# Patient Record
Sex: MALE | Race: WHITE | NOT HISPANIC OR LATINO | Employment: OTHER | URBAN - METROPOLITAN AREA
[De-identification: names, ages, dates, MRNs, and addresses within clinical notes are randomized per-mention and may not be internally consistent; named-entity substitution may affect disease eponyms.]

---

## 2017-10-24 ENCOUNTER — TELEPHONE (OUTPATIENT)
Dept: ORTHOPEDIC SURGERY | Facility: CLINIC | Age: 57
End: 2017-10-24

## 2017-10-24 NOTE — TELEPHONE ENCOUNTER
GABRIELLA patient from 2010. When Dr. Amandeep Taylor called about his son's appt he asked about GABRIELLA seeing Mr. Garnica for a problem with his hip. Patient's cell: [phone]. Please advise.

## 2017-10-25 NOTE — TELEPHONE ENCOUNTER
Left 2nd message for patient about appt with GABRIELLA on 10/26 @ 8:00 a.m. Told him if he gets message after 5 pm, he can still come in tomorrow./[place]

## 2017-10-26 ENCOUNTER — OFFICE VISIT (OUTPATIENT)
Dept: ORTHOPEDIC SURGERY | Facility: CLINIC | Age: 57
End: 2017-10-26

## 2017-10-26 VITALS — HEIGHT: 69 IN | BODY MASS INDEX: 23.7 KG/M2 | TEMPERATURE: 97.5 F | WEIGHT: 160 LBS

## 2017-10-26 DIAGNOSIS — G57.02 PIRIFORMIS SYNDROME OF LEFT SIDE: ICD-10-CM

## 2017-10-26 DIAGNOSIS — M25.552 LEFT HIP PAIN: Primary | ICD-10-CM

## 2017-10-26 PROCEDURE — 99203 OFFICE O/P NEW LOW 30 MIN: CPT | Performed by: ORTHOPAEDIC SURGERY

## 2017-10-26 PROCEDURE — 73502 X-RAY EXAM HIP UNI 2-3 VIEWS: CPT | Performed by: ORTHOPAEDIC SURGERY

## 2017-10-26 NOTE — PROGRESS NOTES
"Patient:  Rishabh Garnica is a 56 y.o. male    Chief Complaint/ Reason for Visit:    Chief Complaint   Patient presents with   • Left Hip - Establish Care, Pain       HPI:  Matthew, who is a dear friend of mine, presents today complaining of pain in the posterior lateral aspect of his left hip.  It has been bothering him intermittently for about a year.  He said that it would occasionally give him a sharp, shooting pain when he has all of his weight on his left leg and pivots.  If he is running or walking in a straight line he does not typically have pain.  He says that this past Saturday he ran for 2 hours when typically he only runs 1-1-1/2 hours.  After that, beginning Sunday and into Monday and Tuesday the frequency and intensity of the pain shooting up from his hip when he would pivot on his left lower extremity escalated significantly.  He says \"it's a really sharp pain that feels like a lightening bolt.\"  The pain is nonradiating.  He has no groin pain.  He has no thigh pain or back pain.  He has no numbness, tingling, or weakness.  He has not had a specific injury.      PMH:  History reviewed. No pertinent past medical history.    PSH:    Past Surgical History:   Procedure Laterality Date   • SHOULDER SURGERY  2010    laproscopic labral tear       Social Hx:    Social History     Social History   • Marital status:      Spouse name: N/A   • Number of children: N/A   • Years of education: N/A     Occupational History   • Not on file.     Social History Main Topics   • Smoking status: Never Smoker   • Smokeless tobacco: Never Used   • Alcohol use Yes   • Drug use: No   • Sexual activity: Defer     Other Topics Concern   • Not on file     Social History Narrative   • No narrative on file       Family Hx:  History reviewed. No pertinent family history.    Meds:  No current outpatient prescriptions on file.    Allergies:  No Known Allergies    ROS:  Review of Systems   Constitutional: Negative for chills, fever " "and unexpected weight change.   HENT: Negative for trouble swallowing and voice change.    Eyes: Negative for visual disturbance.   Respiratory: Negative for cough and shortness of breath.    Cardiovascular: Negative for chest pain and leg swelling.   Gastrointestinal: Negative for abdominal pain, nausea and vomiting.   Endocrine: Negative for cold intolerance and heat intolerance.   Genitourinary: Negative for difficulty urinating, frequency and urgency.   Musculoskeletal: Positive for arthralgias, joint swelling and myalgias.   Skin: Negative for rash and wound.   Allergic/Immunologic: Negative for immunocompromised state.   Neurological: Negative for weakness and numbness.   Hematological: Does not bruise/bleed easily.   Psychiatric/Behavioral: Negative for dysphoric mood. The patient is not nervous/anxious.        Vitals:    10/26/17 0820   Temp: 97.5 °F (36.4 °C)   TempSrc: Temporal Artery    Weight: 160 lb (72.6 kg)   Height: 69\" (175.3 cm)       Physical Exam    The patient is awake, alert, and oriented ×3.  The patient is in no acute distress.  Breathing is regular and unlabored with a respiratory rate of 12/m.  Extraocular movements and pupillary responses are symmetrically intact. Sclerae are anicteric.   Hearing is within normal limits.  Speech is within normal limits.  There is no jugular venous distention.    He is walking well with no limp.  He has no atrophy or asymmetry of the musculature of the lower extremities.  Motor strength is 5 over 5 in both lower extremities.  Logroll and Stinchfield test are negative in both hips.  He does have point tenderness along the posterior superior edge of the greater trochanter, and just posterior to it to deep palpation.  Cross leg abduction with rotation mildly reproduces the pain.  He has full active and passive ranges of motion of both hips symmetrically.  Straight leg raising is negative bilaterally.  Sensory exam is intact and normal light touch in both lower " extremities.  He has no popliteal or inguinal lymphadenopathy in either lower extremity.  He has 2+ dorsalis pedis and posterior tibial pulses present symmetrically in both feet with a current regular heart rate of about 66 bpm.  Skin and nails are unremarkable.      Radiology: X-rays: AP pelvis, AP and lateral left hip were ordered and reviewed today to assess patient's complaints of one year of left hip pain intermittently.  These images are essentially normal.  Comparison images were not available.          Assessment:  1. Left hip pain    - XR Hip With or Without Pelvis 2 - 3 View Left  - Ambulatory Referral to Physical Therapy Evaluate and treat, Ortho    2. Piriformis syndrome of left side    - Ambulatory Referral to Physical Therapy Evaluate and treat, Ortho          Plan:  I discussed everything with Matthew at length.  I explained that his hip joint itself is fine.  I think that he has a tendinitis in either one of the abductors or external rotators.  I think he may have a piriformis syndrome.  I think physical therapy will be greatly effective in helping him resolve this problem.  I advised him to avoid training errors such as increasing the amount or distance he runs by 30-50% as he did on Saturday.  He says he understands.    We discussed activity recommendations and precautions and reviewed expectations with regard to resolution.  Appropriate referral was created for PT.  I will see him back periodically for progress check.      Orders Placed This Encounter   Procedures   • XR Hip With or Without Pelvis 2 - 3 View Left     Order Specific Question:   Reason for Exam:     Answer:   left hip pain   • Ambulatory Referral to Physical Therapy Evaluate and treat, Ortho     Referral Priority:   Routine     Referral Type:   Therapy     Referral Reason:   Specialty Services Required     Referral Location:   [facility]     Requested Specialty:   Physical Therapy     Number of Visits Requested:   1

## 2017-12-20 ENCOUNTER — TELEPHONE (OUTPATIENT)
Dept: ORTHOPEDIC SURGERY | Facility: CLINIC | Age: 57
End: 2017-12-20

## 2018-01-30 DIAGNOSIS — G57.02 PIRIFORMIS SYNDROME OF LEFT SIDE: Primary | ICD-10-CM

## 2018-01-30 DIAGNOSIS — M25.552 LEFT HIP PAIN: ICD-10-CM

## 2023-09-06 ENCOUNTER — OFFICE VISIT (OUTPATIENT)
Dept: ORTHOPEDIC SURGERY | Facility: CLINIC | Age: 63
End: 2023-09-06
Payer: COMMERCIAL

## 2023-09-06 VITALS — TEMPERATURE: 98 F | WEIGHT: 162.4 LBS | HEIGHT: 69 IN | BODY MASS INDEX: 24.05 KG/M2

## 2023-09-06 DIAGNOSIS — M25.561 RIGHT KNEE PAIN, UNSPECIFIED CHRONICITY: Primary | ICD-10-CM

## 2023-09-06 PROBLEM — M17.9 OSTEOARTHRITIS OF KNEE: Status: ACTIVE | Noted: 2023-09-06

## 2023-09-06 PROCEDURE — 99203 OFFICE O/P NEW LOW 30 MIN: CPT | Performed by: ORTHOPAEDIC SURGERY

## 2023-09-06 RX ORDER — MELOXICAM 7.5 MG/1
15 TABLET ORAL ONCE
OUTPATIENT
Start: 2023-09-06 | End: 2023-09-06

## 2023-09-06 RX ORDER — TESTOSTERONE CYPIONATE 200 MG/ML
INJECTION, SOLUTION INTRAMUSCULAR
COMMUNITY
Start: 2023-06-21

## 2023-09-06 RX ORDER — PREGABALIN 150 MG/1
150 CAPSULE ORAL ONCE
OUTPATIENT
Start: 2023-09-06 | End: 2023-09-06

## 2023-09-06 RX ORDER — ACETAMINOPHEN 325 MG/1
1000 TABLET ORAL ONCE
OUTPATIENT
Start: 2023-09-06 | End: 2023-09-06

## 2023-09-06 RX ORDER — CHLORHEXIDINE GLUCONATE 500 MG/1
CLOTH TOPICAL 2 TIMES DAILY
OUTPATIENT
Start: 2023-09-06

## 2023-09-06 RX ORDER — LISDEXAMFETAMINE DIMESYLATE 40 MG/1
1 CAPSULE ORAL DAILY
COMMUNITY
Start: 2023-09-01

## 2023-09-06 NOTE — PROGRESS NOTES
Patient: Rishabh Garnica    YOB: 1960    Medical Record Number: [id number]    Chief Complaints:  Right knee pain    History of Present Illness:     62 y.o. male patient who presents for evaluation of right knee pain. He is a friend of Nicholas Jeff.  He reports that the symptoms first began earlier this year.  He was doing some squats during a particular exercise routine and he felt something pop in the knee.  The knee has continued to bother him ever since then.  He has been treated by another orthopedist.  He had an injection done in July that helped although the effects were transient.  He has tried using meloxicam which does help somewhat.  He has also been using a topical anti-inflammatory which has not been quite as beneficial.  He was referred to physical therapy.  The therapist taught him a home program and he reports that he has been diligent about those exercises.  Despite all this conservative treatment, the knee has continued to bother him..  Current pain is described as moderate, constant and aching.  The pain is all along the medial side of the knee.  Denies any anterior or lateral pain.  Denies any locking or catching.  He is a former avid runner.  He started to feel little bit better after the injection in July and he tried going back to running.  This flared everything up and his knee got very swollen.  He went back and saw his other orthopedist and had an aspiration.  This was just about 3 weeks ago.      Allergies: No Known Allergies    Home Medications    Current Outpatient Medications:     Diclofenac Sodium (VOLTAREN) 1 % gel gel, APPLY 2 G TOPICALLY 2 (TWO) TIMES A DAY IF NEEDED (PAIN AND INFLAMMATION)., Disp: , Rfl:     Testosterone Cypionate (DEPOTESTOTERONE CYPIONATE) 200 MG/ML injection, 1 ML INTRAMUSCULARLY EVERY 2 WEEKS, Disp: , Rfl:     Vyvanse 40 MG capsule, Take 1 capsule by mouth Daily, Disp: , Rfl:     History reviewed. No pertinent past medical history.    Past  "Surgical History:   Procedure Laterality Date    SHOULDER SURGERY  2010    laproscopic labral tear       Social History     Occupational History    Not on file   Tobacco Use    Smoking status: Never    Smokeless tobacco: Never   Vaping Use    Vaping Use: Never used   Substance and Sexual Activity    Alcohol use: Yes    Drug use: No    Sexual activity: Defer      Social History     Social History Narrative    Not on file       History reviewed. No pertinent family history.    Review of Systems:      Constitutional: Denies fever, shaking or chills   Eyes: Denies change in visual acuity   HEENT: Denies nasal congestion or sore throat   Respiratory: Denies cough or shortness of breath   Cardiovascular: Denies chest pain or edema  Endocrine: Denies tremors, palpitations, intolerance of heat or cold, polyuria, polydipsia.  GI: Denies abdominal pain, nausea, vomiting, bloody stools or diarrhea  : Denies frequency, urgency, incontinence, retention, or nocturia.  Musculoskeletal: Denies numbness, tingling or loss of motor function except as above  Integument: Denies rash, lesion or ulceration   Neurologic: Denies headache or focal weakness, deficits  Heme: Denies spontaneous or excessive bleeding, epistaxis, hematuria, melena, fatigue, enlarged or tender lymph nodes.      All other pertinent positives and negatives as noted above in HPI.    Physical Exam:   62 y.o. male  Vitals:    09/06/23 0946   Temp: 98 °F (36.7 °C)   Weight: 73.7 kg (162 lb 6.4 oz)   Height: 175.3 cm (69\")     General:  Patient is awake and alert.  Appears in no acute distress or discomfort.    Psych:  Affect and demeanor are appropriate.    Cardiovascular:  Regular rate and rhythm.    Lungs:  Good chest expansion.  Breathing unlabored.    Spine:  Back appears grossly normal.  No palpable masses or adenopathy.  Good motion.  Straight leg raise and crossed straight leg raise maneuver are both negative for lower leg and/or knee pain.    Extremities:  " Right knee is examined.  Skin is benign.  No obvious gross abnormalities.  No palpable masses or adenopathy.  Moderate tenderness noted over medial joint line.  Medial and lateral Manju's are both uncomfortable but neither is frankly positive.  Motion is full an symmetric to his left.  No instability.  Strength is well preserved including hip flexion, knee extension, ankle and toe plantarflexion, ankle inversion and eversion.  Good sensory function throughout the leg and foot.  Palpable pulses.  Brisk capillary refill.  Good skin turgor.         Radiology:   X-ray report for the right knee is available for review.  I do not have the images themselves.  Report describes medial and patellofemoral compartment arthritic changes.  MRI right knee is available for review.  I have reviewed the images independently and read the radiology report.  He has full-thickness chondral loss on both sides of the joint in the medial compartment.  There is subchondral marrow edema in the medial tibial plateau.  He has some fraying of the medial meniscus but I do not see any obvious unstable tears.  He does have some patellofemoral fissuring but he does not appear to have profound patellofemoral arthritis.  Lateral compartment looks fine.    Assessment/Plan:  Right knee medial compartment osteoarthritis    I got the impression he was hoping that we could just do a scope and address the meniscus tear.  I told him I do not see anything on his MRI that I consider would be amenable to arthroscopic intervention.  I think any benefits from a scope would likely be short-term.  As far as running, I told him I do not see anything I can offer him that is likely to get him back to that activity.  Even with an arthroplasty, I would recommend low impact activities.  We talked about some exercises such as cycling and swimming that would be better for him long-term.      Regarding other treatment options for him, we had a long discussion.  He has  basically exhausted conservative treatment measures at this point.  I am certainly happy to offer him another injection in a couple of months if he would like.  He feels like that is just putting a Band-Aid on things.  He expressed an interest in pursuing a more permanent solution.  I do consider that he is a candidate for unicompartment arthroplasty.  The risk, benefits and alternatives of this procedure were thoroughly discussed.  He knowledge understanding of this information and stated that he would like to move forward with that.  I will look at getting this scheduled for him.  We need to wait at least until November due to his recent injection.  I would want him to come back in for a preoperative visit with me prior to the surgery.  He knowledge understanding and agrees.    Samuel Ash MD    09/06/2023    CC to Shreya Reis MD

## 2023-09-11 PROBLEM — M25.561 RIGHT KNEE PAIN: Status: ACTIVE | Noted: 2023-09-11

## 2023-11-07 ENCOUNTER — PRE-ADMISSION TESTING (OUTPATIENT)
Dept: PREADMISSION TESTING | Facility: HOSPITAL | Age: 63
End: 2023-11-07
Payer: COMMERCIAL

## 2023-11-07 VITALS
DIASTOLIC BLOOD PRESSURE: 85 MMHG | OXYGEN SATURATION: 97 % | SYSTOLIC BLOOD PRESSURE: 154 MMHG | HEART RATE: 73 BPM | BODY MASS INDEX: 25.31 KG/M2 | RESPIRATION RATE: 20 BRPM | TEMPERATURE: 98 F | HEIGHT: 68 IN | WEIGHT: 167 LBS

## 2023-11-07 LAB
ANION GAP SERPL CALCULATED.3IONS-SCNC: 11 MMOL/L (ref 5–15)
BUN SERPL-MCNC: 17 MG/DL (ref 8–23)
BUN/CREAT SERPL: 18.7 (ref 7–25)
CALCIUM SPEC-SCNC: 9.8 MG/DL (ref 8.6–10.5)
CHLORIDE SERPL-SCNC: 99 MMOL/L (ref 98–107)
CO2 SERPL-SCNC: 27 MMOL/L (ref 22–29)
CREAT SERPL-MCNC: 0.91 MG/DL (ref 0.76–1.27)
DEPRECATED RDW RBC AUTO: 42.4 FL (ref 37–54)
EGFRCR SERPLBLD CKD-EPI 2021: 95.9 ML/MIN/1.73
ERYTHROCYTE [DISTWIDTH] IN BLOOD BY AUTOMATED COUNT: 13.1 % (ref 12.3–15.4)
GLUCOSE SERPL-MCNC: 100 MG/DL (ref 65–99)
HCT VFR BLD AUTO: 44.9 % (ref 37.5–51)
HGB BLD-MCNC: 15.6 G/DL (ref 13–17.7)
MCH RBC QN AUTO: 31.5 PG (ref 26.6–33)
MCHC RBC AUTO-ENTMCNC: 34.7 G/DL (ref 31.5–35.7)
MCV RBC AUTO: 90.5 FL (ref 79–97)
PLATELET # BLD AUTO: 240 10*3/MM3 (ref 140–450)
PMV BLD AUTO: 10.1 FL (ref 6–12)
POTASSIUM SERPL-SCNC: 4.3 MMOL/L (ref 3.5–5.2)
QT INTERVAL: 393 MS
QTC INTERVAL: 403 MS
RBC # BLD AUTO: 4.96 10*6/MM3 (ref 4.14–5.8)
SODIUM SERPL-SCNC: 137 MMOL/L (ref 136–145)
WBC NRBC COR # BLD: 6.84 10*3/MM3 (ref 3.4–10.8)

## 2023-11-07 PROCEDURE — 93005 ELECTROCARDIOGRAM TRACING: CPT

## 2023-11-07 PROCEDURE — 36415 COLL VENOUS BLD VENIPUNCTURE: CPT

## 2023-11-07 PROCEDURE — 85027 COMPLETE CBC AUTOMATED: CPT

## 2023-11-07 PROCEDURE — 93010 ELECTROCARDIOGRAM REPORT: CPT | Performed by: INTERNAL MEDICINE

## 2023-11-07 PROCEDURE — 80048 BASIC METABOLIC PNL TOTAL CA: CPT

## 2023-11-07 NOTE — DISCHARGE INSTRUCTIONS

## 2023-11-09 ENCOUNTER — TELEPHONE (OUTPATIENT)
Dept: ORTHOPEDIC SURGERY | Facility: HOSPITAL | Age: 63
End: 2023-11-09
Payer: COMMERCIAL

## 2023-11-09 NOTE — TELEPHONE ENCOUNTER
Risk Factor yes no   Age >75  X   BMI <20 >40  X   Patient History     Chronic Pain (2 or more meds/Pain Management)  X   ETOH (more than 3 drinks Daily)  X   Uncontrolled Depression/Bipolar/Schizoaffective Disorder  X   Arrhythmias--  X   Stent placement/MI  X   DVT/PE  X   Pacemaker  X   HTN (uncontrolled or requiring more than 2 medications)  X   CHF/Retained fluids/Edema  X   Stroke with Residual   X   COPD/Asthma  X   CHARLY--Non-compliant with CPAP  X   Diabetes (on insulin or more than 2 meds)         A1C:  X   BPH/Urinary retention (on medication)  X   CKD  X   Home environment and support     Current ambulation status (use of cane, walker, W/C, Multiple falls/weakness)--   X   Stairs to enter and throughout home X    Lives Alone X    Doesn't have support at home  X   Outpatient Screening Assessment    Home needs: (Walker/BSC):  Needs walker   ? Steps 3 steps   Caregiver 24-48hrs post-discharge: Daughter     Discharge Plan:   Wants to do OP PT with [facility]      Prescriptions: Meds to bed    Home medications: NO MEDICATIONS   [] Blood thinner/anti-coag therapy--    [] BPH or diuretic--   [] BP meds--   [] Pain/Anti-inflammatories--  Pre-op Education:  Educate patient on spinal anesthesia/pain control:  ? patient verbalize understanding    Educate patient on hospital course/timeline:  ?  patient verbalize understanding    Joint Care Class:  []  yes ? no  Notes:

## 2023-11-13 ENCOUNTER — OFFICE VISIT (OUTPATIENT)
Dept: ORTHOPEDIC SURGERY | Facility: CLINIC | Age: 63
End: 2023-11-13
Payer: COMMERCIAL

## 2023-11-13 VITALS — WEIGHT: 155.3 LBS | BODY MASS INDEX: 23.54 KG/M2 | TEMPERATURE: 98.3 F | HEIGHT: 68 IN

## 2023-11-13 DIAGNOSIS — M25.561 RIGHT KNEE PAIN, UNSPECIFIED CHRONICITY: Primary | ICD-10-CM

## 2023-11-13 DIAGNOSIS — M17.10 ARTHRITIS OF KNEE: ICD-10-CM

## 2023-11-13 PROCEDURE — 73562 X-RAY EXAM OF KNEE 3: CPT | Performed by: ORTHOPAEDIC SURGERY

## 2023-11-13 PROCEDURE — 99213 OFFICE O/P EST LOW 20 MIN: CPT | Performed by: ORTHOPAEDIC SURGERY

## 2023-11-13 RX ORDER — MELOXICAM 15 MG/1
15 TABLET ORAL DAILY
COMMUNITY

## 2023-11-13 NOTE — PROGRESS NOTES
CC:  Follow up right knee medial compartment arthritis    Mr. Garnica comes in today for a pre-op for a right knee unicompartment replacement.  He tells me he is not having any pain in the knee.  He says that he is able to do everything that he wants to do on a daily basis except jog.  He wonders if he should go through with the replacement so he can get back to jogging.    We had a long discussion.   I would not recommend going back to jogging with the replacement.  I would recommend he stick to low impact exercises.  I told him this is a surgery for which the primary indication is pain.  I assured him there is no hurry to do the replacement.  Given that he is not having any pain and has good function, I recommend canceling his surgery.  He agrees.  He will follow-up as needed    Samuel Ash MD